# Patient Record
Sex: MALE | URBAN - METROPOLITAN AREA
[De-identification: names, ages, dates, MRNs, and addresses within clinical notes are randomized per-mention and may not be internally consistent; named-entity substitution may affect disease eponyms.]

---

## 2020-01-08 ENCOUNTER — APPOINTMENT (RX ONLY)
Dept: URBAN - METROPOLITAN AREA CLINIC 152 | Facility: CLINIC | Age: 30
Setting detail: DERMATOLOGY
End: 2020-01-08

## 2020-01-08 DIAGNOSIS — L20.89 OTHER ATOPIC DERMATITIS: ICD-10-CM | Status: INADEQUATELY CONTROLLED

## 2020-01-08 PROBLEM — L20.84 INTRINSIC (ALLERGIC) ECZEMA: Status: ACTIVE | Noted: 2020-01-08

## 2020-01-08 PROCEDURE — ? ORDER TESTS

## 2020-01-08 PROCEDURE — ? PRESCRIPTION SAMPLES PROVIDED

## 2020-01-08 PROCEDURE — 99203 OFFICE O/P NEW LOW 30 MIN: CPT

## 2020-01-08 PROCEDURE — ? PRESCRIPTION

## 2020-01-08 PROCEDURE — ? COUNSELING

## 2020-01-08 PROCEDURE — ? DIAGNOSIS COMMENT

## 2020-01-08 NOTE — PROCEDURE: COUNSELING
Patient Specific Counseling (Will Not Stick From Patient To Patient): - Discussed nature and etiology of eczema.\\n- Discussed likely diagnosis of Eczema and less likely Tinea Cruris which is a dermatophyte infection of the groin. Most forms of Tinea Cruris can be treated with topical antifungal creams. Severe or unresponsive cases can be treated with griseofulvin or terbinafine.\\n- Patient opts for treating as Tinea Cruris. Discussed performing a culture to confirm this diagnosis and treatment with Lamisil 250mg QD PO x2weeks. Provided patient with samples of Cordran ointment to use if no improvement in 2 weeks.\\n- RTC if no improvement in 1mo.
Detail Level: Detailed

## 2020-01-08 NOTE — HPI: RASH
Is This A New Presentation, Or A Follow-Up?: Rash
Additional History: In 2018 his rash improved after using a topical (combination of an antifungal and steroid) prescribed by his PCP. It reappeared shortly after. It waxes and wanes. \\n\\nPatient uses Zeasorb AF powder which improves his condition drys out his skin.

## 2020-01-08 NOTE — PROCEDURE: ORDER TESTS
Performing Laboratory: [id number]
Billing Type: Third-Party Bill
Bill For Surgical Tray: no
Expected Date Of Service: 01/08/2020

## 2020-01-08 NOTE — PROCEDURE: DIAGNOSIS COMMENT
Comment: NP. Favor eczema. Patient opts for treating as Tinea Cruris d/t improvement w/ antifungal+steroid in the past. Has tried Zeasorb AF but only when rash is active and notes it is too drying. Fungal culture performed today to confirm. Provided patient with samples of Cordran ointment to use if no improvement in 2 weeks.
Detail Level: Simple

## 2023-01-19 ENCOUNTER — OFFICE (OUTPATIENT)
Dept: URBAN - METROPOLITAN AREA CLINIC 18 | Facility: CLINIC | Age: 33
End: 2023-01-19

## 2023-01-19 VITALS
WEIGHT: 124 LBS | HEIGHT: 67 IN | DIASTOLIC BLOOD PRESSURE: 72 MMHG | HEART RATE: 100 BPM | SYSTOLIC BLOOD PRESSURE: 118 MMHG

## 2023-01-19 DIAGNOSIS — R11.0 NAUSEA: ICD-10-CM

## 2023-01-19 DIAGNOSIS — K59.09 OTHER CONSTIPATION: ICD-10-CM

## 2023-01-19 DIAGNOSIS — K21.9 GASTRO-ESOPHAGEAL REFLUX DISEASE WITHOUT ESOPHAGITIS: ICD-10-CM

## 2023-01-19 DIAGNOSIS — R14.0 ABDOMINAL DISTENSION (GASEOUS): ICD-10-CM

## 2023-01-19 PROCEDURE — 99203 OFFICE O/P NEW LOW 30 MIN: CPT | Performed by: INTERNAL MEDICINE

## 2023-01-19 RX ORDER — OMEPRAZOLE 40 MG/1
CAPSULE, DELAYED RELEASE ORAL
Qty: 30 | Refills: 5 | Status: COMPLETED
Start: 2023-01-19 | End: 2023-02-10

## 2023-02-10 ENCOUNTER — OFFICE (OUTPATIENT)
Dept: URBAN - METROPOLITAN AREA CLINIC 18 | Facility: CLINIC | Age: 33
End: 2023-02-10

## 2023-02-10 VITALS
WEIGHT: 128 LBS | HEART RATE: 96 BPM | HEIGHT: 67 IN | DIASTOLIC BLOOD PRESSURE: 68 MMHG | SYSTOLIC BLOOD PRESSURE: 102 MMHG

## 2023-02-10 DIAGNOSIS — R11.0 NAUSEA: ICD-10-CM

## 2023-02-10 DIAGNOSIS — K59.09 OTHER CONSTIPATION: ICD-10-CM

## 2023-02-10 DIAGNOSIS — R14.0 ABDOMINAL DISTENSION (GASEOUS): ICD-10-CM

## 2023-02-10 DIAGNOSIS — R10.13 EPIGASTRIC PAIN: ICD-10-CM

## 2023-02-10 PROCEDURE — 99213 OFFICE O/P EST LOW 20 MIN: CPT | Mod: SA

## 2023-02-16 ENCOUNTER — AMBULATORY SURGICAL CENTER (OUTPATIENT)
Dept: URBAN - METROPOLITAN AREA SURGERY 7 | Facility: SURGERY | Age: 33
End: 2023-02-16

## 2023-02-16 ENCOUNTER — OFFICE (OUTPATIENT)
Dept: URBAN - METROPOLITAN AREA PATHOLOGY 1 | Facility: PATHOLOGY | Age: 33
End: 2023-02-16

## 2023-02-16 VITALS
DIASTOLIC BLOOD PRESSURE: 58 MMHG | SYSTOLIC BLOOD PRESSURE: 85 MMHG | WEIGHT: 120 LBS | OXYGEN SATURATION: 98 % | SYSTOLIC BLOOD PRESSURE: 101 MMHG | RESPIRATION RATE: 4 BRPM | OXYGEN SATURATION: 98 % | HEART RATE: 95 BPM | SYSTOLIC BLOOD PRESSURE: 109 MMHG | DIASTOLIC BLOOD PRESSURE: 57 MMHG | DIASTOLIC BLOOD PRESSURE: 81 MMHG | DIASTOLIC BLOOD PRESSURE: 81 MMHG | DIASTOLIC BLOOD PRESSURE: 49 MMHG | HEART RATE: 91 BPM | WEIGHT: 120 LBS | SYSTOLIC BLOOD PRESSURE: 94 MMHG | TEMPERATURE: 97.1 F | HEART RATE: 85 BPM | SYSTOLIC BLOOD PRESSURE: 81 MMHG | HEART RATE: 95 BPM | HEART RATE: 93 BPM | RESPIRATION RATE: 4 BRPM | DIASTOLIC BLOOD PRESSURE: 64 MMHG | SYSTOLIC BLOOD PRESSURE: 114 MMHG | SYSTOLIC BLOOD PRESSURE: 85 MMHG | DIASTOLIC BLOOD PRESSURE: 49 MMHG | HEART RATE: 87 BPM | SYSTOLIC BLOOD PRESSURE: 94 MMHG | RESPIRATION RATE: 21 BRPM | HEART RATE: 75 BPM | DIASTOLIC BLOOD PRESSURE: 55 MMHG | RESPIRATION RATE: 15 BRPM | SYSTOLIC BLOOD PRESSURE: 114 MMHG | HEIGHT: 67 IN | DIASTOLIC BLOOD PRESSURE: 97 MMHG | HEART RATE: 87 BPM | SYSTOLIC BLOOD PRESSURE: 98 MMHG | SYSTOLIC BLOOD PRESSURE: 123 MMHG | SYSTOLIC BLOOD PRESSURE: 81 MMHG | HEART RATE: 93 BPM | SYSTOLIC BLOOD PRESSURE: 87 MMHG | DIASTOLIC BLOOD PRESSURE: 51 MMHG | OXYGEN SATURATION: 100 % | HEART RATE: 132 BPM | HEIGHT: 67 IN | SYSTOLIC BLOOD PRESSURE: 123 MMHG | SYSTOLIC BLOOD PRESSURE: 87 MMHG | DIASTOLIC BLOOD PRESSURE: 58 MMHG | HEART RATE: 132 BPM | SYSTOLIC BLOOD PRESSURE: 101 MMHG | RESPIRATION RATE: 17 BRPM | DIASTOLIC BLOOD PRESSURE: 69 MMHG | HEART RATE: 92 BPM | DIASTOLIC BLOOD PRESSURE: 57 MMHG | OXYGEN SATURATION: 99 % | SYSTOLIC BLOOD PRESSURE: 82 MMHG | SYSTOLIC BLOOD PRESSURE: 98 MMHG | RESPIRATION RATE: 17 BRPM | HEART RATE: 94 BPM | DIASTOLIC BLOOD PRESSURE: 45 MMHG | DIASTOLIC BLOOD PRESSURE: 55 MMHG | SYSTOLIC BLOOD PRESSURE: 109 MMHG | DIASTOLIC BLOOD PRESSURE: 45 MMHG | OXYGEN SATURATION: 100 % | HEART RATE: 94 BPM | RESPIRATION RATE: 16 BRPM | DIASTOLIC BLOOD PRESSURE: 97 MMHG | SYSTOLIC BLOOD PRESSURE: 82 MMHG | HEART RATE: 91 BPM | HEART RATE: 92 BPM | RESPIRATION RATE: 15 BRPM | DIASTOLIC BLOOD PRESSURE: 64 MMHG | TEMPERATURE: 97.1 F | RESPIRATION RATE: 21 BRPM | RESPIRATION RATE: 16 BRPM | HEART RATE: 75 BPM | HEART RATE: 85 BPM | DIASTOLIC BLOOD PRESSURE: 69 MMHG | OXYGEN SATURATION: 99 % | DIASTOLIC BLOOD PRESSURE: 51 MMHG

## 2023-02-16 DIAGNOSIS — R10.817 GENERALIZED ABDOMINAL TENDERNESS: ICD-10-CM

## 2023-02-16 DIAGNOSIS — R11.0 NAUSEA: ICD-10-CM

## 2023-02-16 DIAGNOSIS — K59.09 OTHER CONSTIPATION: ICD-10-CM

## 2023-02-16 DIAGNOSIS — K64.1 SECOND DEGREE HEMORRHOIDS: ICD-10-CM

## 2023-02-16 DIAGNOSIS — K29.60 OTHER GASTRITIS WITHOUT BLEEDING: ICD-10-CM

## 2023-02-16 DIAGNOSIS — R14.0 ABDOMINAL DISTENSION (GASEOUS): ICD-10-CM

## 2023-02-16 PROBLEM — K31.89 OTHER DISEASES OF STOMACH AND DUODENUM: Status: ACTIVE | Noted: 2023-02-16

## 2023-02-16 PROCEDURE — 45378 DIAGNOSTIC COLONOSCOPY: CPT | Performed by: INTERNAL MEDICINE

## 2023-02-16 PROCEDURE — 43239 EGD BIOPSY SINGLE/MULTIPLE: CPT | Performed by: INTERNAL MEDICINE

## 2023-02-16 PROCEDURE — 88305 TISSUE EXAM BY PATHOLOGIST: CPT | Performed by: PATHOLOGY

## 2023-02-16 PROCEDURE — 88312 SPECIAL STAINS GROUP 1: CPT | Performed by: PATHOLOGY

## 2023-02-16 RX ORDER — OMEPRAZOLE 40 MG/1
CAPSULE, DELAYED RELEASE ORAL
Qty: 30 | Refills: 3 | Status: ACTIVE
Start: 2023-02-16

## 2023-02-22 LAB
PDF: PDF REPORT: (no result)
PDF: PDF REPORT: (no result)